# Patient Record
Sex: FEMALE | Race: WHITE
[De-identification: names, ages, dates, MRNs, and addresses within clinical notes are randomized per-mention and may not be internally consistent; named-entity substitution may affect disease eponyms.]

---

## 2018-04-09 ENCOUNTER — HOSPITAL ENCOUNTER (OUTPATIENT)
Dept: HOSPITAL 76 - DI | Age: 58
Discharge: HOME | End: 2018-04-09
Attending: INTERNAL MEDICINE
Payer: COMMERCIAL

## 2018-04-09 DIAGNOSIS — R07.9: Primary | ICD-10-CM

## 2018-04-09 PROCEDURE — 71046 X-RAY EXAM CHEST 2 VIEWS: CPT

## 2018-04-10 NOTE — XRAY REPORT
TWO VIEW CHEST:  04/09/2018

 

CLINICAL INDICATION:  Chest pain.

 

FINDINGS:  Frontal and lateral views of the chest demonstrate a normal cardiac silhouette.  The

lungs are clear.  No effusion or pneumothorax is present.

 

IMPRESSION:  NORMAL CHEST.  NO EVIDENT ETIOLOGY FOR PATIENT'S CHEST PAIN.

 

 

DD: 04/10/2018 09:03

TD: 04/10/2018 09:32

Job #: [id number]

## 2019-11-19 ENCOUNTER — HOSPITAL ENCOUNTER (OUTPATIENT)
Dept: HOSPITAL 76 - DI.N | Age: 59
Discharge: HOME | End: 2019-11-19
Attending: FAMILY MEDICINE
Payer: COMMERCIAL

## 2019-11-19 DIAGNOSIS — R07.81: Primary | ICD-10-CM

## 2019-11-19 PROCEDURE — 71110 X-RAY EXAM RIBS BIL 3 VIEWS: CPT

## 2019-11-19 NOTE — XRAY REPORT
Reason:  PLEURODYNIA

Procedure Date:  11/19/2019   

Accession Number:  [id number] / [id number]                    

Procedure:  XRN - Ribs 3 View BILAT CPT Code:  

 

***Final Report***

 

 

FULL RESULT:

 

 

EXAM:

BILATERAL RIB RADIOGRAPHY

 

EXAM DATE: 11/19/2019 09:42 AM.

 

CLINICAL HISTORY: Pleurodynia.

 

COMPARISON: CHEST 2 VIEW 04/09/2018 2:49 PM.

 

TECHNIQUE: 2 views.

 

FINDINGS:

Bones: There is suggestion of a pseudoarticulation along the posterior 

10th or 9th rib, possibly projectional artifact. No fracture or bone 

lesion.

 

Lungs: No focal opacities evident. No pneumothorax or pleural effusions.

 

Mediastinum: Heart and cardiomediastinal contours are unremarkable.

 

Other: None.

IMPRESSION: No displaced rib fracture is identified.

 

Questionable pseudoarticulation versus projectional artifact, if present 

this could represent prior trauma, not acute.

 

[facility]

## 2020-02-19 ENCOUNTER — HOSPITAL ENCOUNTER (OUTPATIENT)
Dept: HOSPITAL 76 - DI.N | Age: 60
Discharge: HOME | End: 2020-02-19
Attending: GENERAL ACUTE CARE HOSPITAL
Payer: COMMERCIAL

## 2020-02-19 DIAGNOSIS — Z12.31: Primary | ICD-10-CM

## 2020-02-19 PROCEDURE — 77067 SCR MAMMO BI INCL CAD: CPT

## 2020-02-20 NOTE — MAMMOGRAPHY REPORT
Reason:  ROUTINE MAMMO

Procedure Date:  02/19/2020   

Accession Number:  [id number] / [id number]                    

Procedure:  MGN - Screening Mammo Dig Bilat CPT Code:  

 

***Final Report***

 

 

FULL RESULT:

 

 

EXAM: Screening Mammo Dig Bilat

 

DATE: 2/19/2020 2:38 PM

 

CLINICAL HISTORY:  Screening encounter. New baseline exam.

 

TECHNIQUE: (B) - Bilateral  CC and MLO views were obtained.

 

COMPARISON: None

 

PARENCHYMAL PATTERN: (A) - The breast(s) demonstrate(s) scattered 

fibroglandular densities.

 

FINDINGS:

In the right breast at the 10 to 11:00 position 8 cm from the nipple is a 

isodense 6 mm nodule which should be further characterized with 

ultrasound. There are no suspicious masses, calcifications, or areas of 

distortion in the left breast.

 

IMPRESSION: Incomplete examination.  BI-RADS category 0.

 

RECOMMENDATION: (ADDUS) - Targeted ultrasound recommended. Right breast. 

Please note that additional imaging is potentially obviated if remote 

mammograms are made available for review and demonstrates greater than 2 

year stability of the above right breast finding.

 

BI-RADS CATEGORY: (0) - Incomplete Examination - need additional 

evaluation.

 

STANDARD QUALIFYING STATEMENTS:

1.  This examination was not reviewed with the aid of Computer-Aided 

Detection (CAD).

2.  A negative or benign  imaging report should not preclude biopsy if 

clinically suspicious findings are present.

3.  Dense breasts may obscure an underlying neoplasm.

4. This examination was reviewed without the aid of 3D breast imaging 

(tomosynthesis).

## 2020-03-04 ENCOUNTER — HOSPITAL ENCOUNTER (OUTPATIENT)
Dept: HOSPITAL 76 - DI | Age: 60
Discharge: HOME | End: 2020-03-04
Attending: FAMILY MEDICINE
Payer: COMMERCIAL

## 2020-03-04 DIAGNOSIS — N63.11: Primary | ICD-10-CM

## 2020-03-04 PROCEDURE — 76642 ULTRASOUND BREAST LIMITED: CPT

## 2020-03-04 NOTE — ULTRASOUND REPORT
Reason:  ABNORMAL MAMMOGRAM

Procedure Date:  03/04/2020   

Accession Number:  [id number] / [id number]                    

Procedure:  US  - Breast Unilateral Limited CPT Code:  

 

***Final Report***

 

 

FULL RESULT:

 

 

EXAM: Breast Unilateral Limited

 

DATE: 3/4/2020 1:40 PM

 

CLINICAL HISTORY: The patient is recalled from screening mammography for 

a right breast 10:00 position nodule 8 cm from the nipple.

 

COMPARISON: 2/19/2020.

 

TECHNIQUE: Targeted ultrasound was performed of the right breast in the 

area of clinical concern at 10 o'clock and 8 distance from the nipple. 

Color Doppler was employed as appropriate.

 

FINDINGS: A reniform wider than tall 0.5 cm nodule with fatty hilum and 

vascularity within the hilum only by limited color Doppler is identified 

in the 10:00 axis corresponding to the mammographic finding. This is 

sonographically characterized as a typically benign appearing lymph node. 

No suspicious mass or architectural distortion is identified.

 

IMPRESSION: Benign findings

 

RECOMMENDATION: Recommend routine annual Screening mammography unless 

otherwise clinically indicated.

 

BIRADS CATEGORY 2: Benign findings

 

[facility]

## 2021-04-26 ENCOUNTER — HOSPITAL ENCOUNTER (OUTPATIENT)
Dept: HOSPITAL 76 - DI.N | Age: 61
Discharge: HOME | End: 2021-04-26
Attending: PHYSICIAN ASSISTANT
Payer: COMMERCIAL

## 2021-04-26 DIAGNOSIS — Z12.31: Primary | ICD-10-CM

## 2021-04-27 NOTE — MAMMOGRAPHY REPORT
BILATERAL DIGITAL SCREENING MAMMOGRAM 3D/2D: 4/26/2021

 

CLINICAL: Routine screening.  

 

Comparison is made to exam dated:  2/19/2020 mammogram - [facility].  The tissue of
 both breasts is heterogeneously dense. This may lower the sensitivity of mammography.  

 

No significant masses, calcifications, or other findings are seen in either breast.  

There has been no significant interval change.

 

IMPRESSION: NEGATIVE

There is no mammographic evidence of malignancy. A 1 year screening mammogram is recommended.  

 

 

This exam was interpreted at Station ID: [id number].  

 

NOTE: For mammograms, a report in lay terms will be sent to the patient. Approximately 15% of breast 
malignancies will not be visualized mammographically. In the management of a palpable breast mass, a 
negative mammogram must not discourage biopsy of a clinically suspicious lesion.

 

Electronically Signed By: Charline [email or web address]:4/26/2021 20:06:42  

 

 

 

ACR BI-RADS Category 1: Negative 3341F

PARENCHYMAL PATTERN: (D) - The breast(s) demonstrate(s) heterogeneously dense fibroglandular ortiz leon.

BI-RADS CATEGORY: (1) - 1

RECOMMENDATION: (ANNUAL)  - Recommend routine annual screening mammography.

[id number]

1 year screening

LATERALITY: (B)